# Patient Record
Sex: FEMALE | Race: WHITE | NOT HISPANIC OR LATINO | URBAN - METROPOLITAN AREA
[De-identification: names, ages, dates, MRNs, and addresses within clinical notes are randomized per-mention and may not be internally consistent; named-entity substitution may affect disease eponyms.]

---

## 2018-11-25 ENCOUNTER — HOSPITAL ENCOUNTER (OUTPATIENT)
Dept: MEDSURG UNIT | Facility: HOSPITAL | Age: 73
End: 2018-11-27
Attending: INTERNAL MEDICINE | Admitting: INTERNAL MEDICINE

## 2018-11-25 LAB
ABS NEUT (OLG): 6.7 X10(3)/MCL (ref 1.5–6.9)
ALBUMIN SERPL-MCNC: 3.5 GM/DL (ref 3.4–5)
ALBUMIN/GLOB SERPL: 0.9 RATIO
ALP SERPL-CCNC: 85 UNIT/L (ref 30–113)
ALT SERPL-CCNC: 18 UNIT/L (ref 10–45)
APPEARANCE, UA: CLEAR
APTT PPP: 24 SECOND(S) (ref 25–35)
AST SERPL-CCNC: 15 UNIT/L (ref 15–37)
BACTERIA SPEC CULT: ABNORMAL /HPF
BASOPHILS # BLD AUTO: 0 X10(3)/MCL (ref 0–0.1)
BASOPHILS NFR BLD AUTO: 1 % (ref 0–1)
BILIRUB SERPL-MCNC: 0.5 MG/DL (ref 0.1–0.9)
BILIRUB UR QL STRIP: NEGATIVE
BILIRUBIN DIRECT+TOT PNL SERPL-MCNC: 0.1 MG/DL (ref 0–0.3)
BILIRUBIN DIRECT+TOT PNL SERPL-MCNC: 0.4 MG/DL
BUN SERPL-MCNC: 6 MG/DL (ref 10–20)
CALCIUM SERPL-MCNC: 9.1 MG/DL (ref 8–10.5)
CHLORIDE SERPL-SCNC: 108 MMOL/L (ref 100–108)
CK MB SERPL-MCNC: 1.2 NG/ML (ref 0–3.6)
CK SERPL-CCNC: 73 UNIT/L (ref 39–225)
CO2 SERPL-SCNC: 26 MMOL/L (ref 21–35)
COLOR UR: ABNORMAL
CREAT SERPL-MCNC: 1.13 MG/DL (ref 0.7–1.3)
EOSINOPHIL # BLD AUTO: 0.1 X10(3)/MCL (ref 0–0.6)
EOSINOPHIL NFR BLD AUTO: 1 % (ref 0–5)
ERYTHROCYTE [DISTWIDTH] IN BLOOD BY AUTOMATED COUNT: 13.5 % (ref 11.5–17)
GLOBULIN SER-MCNC: 3.7 GM/DL
GLUCOSE (UA): NEGATIVE
GLUCOSE SERPL-MCNC: 117 MG/DL (ref 75–116)
HCO3 UR-SCNC: 23.7 MMOL/L (ref 22–26)
HCO3 UR-SCNC: 23.7 MMOL/L (ref 22–26)
HCT VFR BLD AUTO: 36.9 % (ref 36–48)
HGB BLD-MCNC: 11.6 GM/DL (ref 12–16)
HGB UR QL STRIP: ABNORMAL
IMM GRANULOCYTES # BLD AUTO: 0.04 10*3/UL (ref 0–0.02)
IMM GRANULOCYTES NFR BLD AUTO: 0.5 % (ref 0–0.43)
INR PPP: 1 (ref 0–1.2)
KETONES UR QL STRIP: NEGATIVE
LACTATE SERPL-SCNC: 1.6 MMOL/L (ref 0.4–2)
LACTATE SERPL-SCNC: 2.3 MMOL/L (ref 0.4–2)
LACTATE SERPL-SCNC: 2.3 MMOL/L (ref 0.4–2)
LACTATE SERPL-SCNC: 2.6 MMOL/L (ref 0.4–2)
LACTATE SERPL-SCNC: 2.7 MMOL/L (ref 0.4–2)
LACTATE SERPL-SCNC: 3.1 MMOL/L (ref 0.4–2)
LACTATE SERPL-SCNC: 3.4 MMOL/L (ref 0.4–2)
LEUKOCYTE ESTERASE UR QL STRIP: ABNORMAL
LYMPHOCYTES # BLD AUTO: 0.9 X10(3)/MCL (ref 0.5–4.1)
LYMPHOCYTES NFR BLD AUTO: 10 % (ref 15–40)
MCH RBC QN AUTO: 29 PG (ref 27–34)
MCHC RBC AUTO-ENTMCNC: 31 GM/DL (ref 31–36)
MCV RBC AUTO: 92 FL (ref 80–99)
MONOCYTES # BLD AUTO: 0.9 X10(3)/MCL (ref 0–1.1)
MONOCYTES NFR BLD AUTO: 10 % (ref 4–12)
MUCOUS THREADS URNS QL MICRO: ABNORMAL
NEUTROPHILS # BLD AUTO: 6.7 X10(3)/MCL (ref 1.5–6.9)
NEUTROPHILS NFR BLD AUTO: 78 % (ref 43–75)
NITRITE UR QL STRIP: NEGATIVE
PCO2 BLDA: 35.1 MMHG (ref 35–45)
PCO2 BLDA: 35.1 MMHG (ref 35–45)
PH SMN: 7.44 [PH] (ref 7.35–7.45)
PH SMN: 7.44 [PH] (ref 7.35–7.45)
PH UR STRIP: 6 [PH]
PLATELET # BLD AUTO: 349 X10(3)/MCL (ref 140–400)
PMV BLD AUTO: 8.8 FL (ref 6.8–10)
PO2 BLDA: 65 MMHG (ref 80–100)
PO2 BLDA: 65 MMHG (ref 80–100)
POC ALLENS TEST: POSITIVE
POC BE: 0 (ref -2–3)
POC BE: 0 MMOL/L (ref -2–3)
POC CO2: 25 MMOL/L (ref 22–27)
POC SATURATED O2: 93 % (ref 96–97)
POC SATURATED O2: 93 MMHG (ref 96–97)
POC SITE: ABNORMAL
POC TCO2: 25 MMOL/L (ref 24–29)
POC TREATMENT: ABNORMAL
POTASSIUM SERPL-SCNC: 3.5 MMOL/L (ref 3.6–5.2)
PROT SERPL-MCNC: 7.2 GM/DL (ref 6.4–8.2)
PROT UR QL STRIP: NEGATIVE
PROTHROMBIN TIME: 11 SECOND(S) (ref 9–12)
RBC # BLD AUTO: 4.03 X10(6)/MCL (ref 4.2–5.4)
RBC #/AREA URNS HPF: ABNORMAL /HPF
SODIUM SERPL-SCNC: 145 MMOL/L (ref 135–145)
SP GR UR STRIP: 1.02
SQUAMOUS EPITHELIAL, UA: ABNORMAL /LPF
TROPONIN I SERPL-MCNC: <0.017 NG/ML (ref 0–0.06)
UROBILINOGEN UR STRIP-ACNC: 0.2 EU/DL
WBC # SPEC AUTO: 8.7 X10(3)/MCL (ref 4.5–11.5)
WBC #/AREA URNS HPF: ABNORMAL /HPF

## 2018-11-26 LAB
ABS NEUT (OLG): 8.7 X10(3)/MCL (ref 1.5–6.9)
BASOPHILS # BLD AUTO: 0 X10(3)/MCL (ref 0–0.1)
BASOPHILS NFR BLD AUTO: 0 % (ref 0–1)
BUN SERPL-MCNC: 10 MG/DL (ref 10–20)
CALCIUM SERPL-MCNC: 8.6 MG/DL (ref 8–10.5)
CHLORIDE SERPL-SCNC: 107 MMOL/L (ref 100–108)
CO2 SERPL-SCNC: 19 MMOL/L (ref 21–35)
CREAT SERPL-MCNC: 1.06 MG/DL (ref 0.7–1.3)
CREAT/UREA NIT SERPL: 9
ERYTHROCYTE [DISTWIDTH] IN BLOOD BY AUTOMATED COUNT: 13.7 % (ref 11.5–17)
GLUCOSE SERPL-MCNC: 196 MG/DL (ref 75–116)
HCT VFR BLD AUTO: 34 % (ref 36–48)
HGB BLD-MCNC: 10.8 GM/DL (ref 12–16)
IMM GRANULOCYTES # BLD AUTO: 0.03 10*3/UL (ref 0–0.02)
IMM GRANULOCYTES NFR BLD AUTO: 0.3 % (ref 0–0.43)
LACTATE SERPL-SCNC: 1.3 MMOL/L (ref 0.4–2)
LACTATE SERPL-SCNC: 2.2 MMOL/L (ref 0.4–2)
LACTATE SERPL-SCNC: 2.4 MMOL/L (ref 0.4–2)
LACTATE SERPL-SCNC: 2.7 MMOL/L (ref 0.4–2)
LACTATE SERPL-SCNC: 2.8 MMOL/L (ref 0.4–2)
LACTATE SERPL-SCNC: 3.3 MMOL/L (ref 0.4–2)
LACTATE SERPL-SCNC: 3.4 MMOL/L (ref 0.4–2)
LACTATE SERPL-SCNC: 3.5 MMOL/L (ref 0.4–2)
LACTATE SERPL-SCNC: 4.2 MMOL/L (ref 0.4–2)
LYMPHOCYTES # BLD AUTO: 0.4 X10(3)/MCL (ref 0.5–4.1)
LYMPHOCYTES NFR BLD AUTO: 4 % (ref 15–40)
MAGNESIUM SERPL-MCNC: 2.2 MG/DL (ref 1.8–2.4)
MCH RBC QN AUTO: 29 PG (ref 27–34)
MCHC RBC AUTO-ENTMCNC: 32 GM/DL (ref 31–36)
MCV RBC AUTO: 92 FL (ref 80–99)
MONOCYTES # BLD AUTO: 0.1 X10(3)/MCL (ref 0–1.1)
MONOCYTES NFR BLD AUTO: 1 % (ref 4–12)
NEUTROPHILS # BLD AUTO: 8.7 X10(3)/MCL (ref 1.5–6.9)
NEUTROPHILS NFR BLD AUTO: 94 % (ref 43–75)
PLATELET # BLD AUTO: 299 X10(3)/MCL (ref 140–400)
PMV BLD AUTO: 9.1 FL (ref 6.8–10)
POTASSIUM SERPL-SCNC: 3.5 MMOL/L (ref 3.6–5.2)
RBC # BLD AUTO: 3.69 X10(6)/MCL (ref 4.2–5.4)
SODIUM SERPL-SCNC: 139 MMOL/L (ref 135–145)
WBC # SPEC AUTO: 9.3 X10(3)/MCL (ref 4.5–11.5)

## 2018-11-27 LAB
ABS NEUT (OLG): 16.6 X10(3)/MCL (ref 1.5–6.9)
BASOPHILS # BLD AUTO: 0 X10(3)/MCL (ref 0–0.1)
BASOPHILS NFR BLD AUTO: 0 % (ref 0–1)
BUN SERPL-MCNC: 15 MG/DL (ref 10–20)
CALCIUM SERPL-MCNC: 9.1 MG/DL (ref 8–10.5)
CHLORIDE SERPL-SCNC: 109 MMOL/L (ref 100–108)
CO2 SERPL-SCNC: 22 MMOL/L (ref 21–35)
CREAT SERPL-MCNC: 1.17 MG/DL (ref 0.7–1.3)
CREAT/UREA NIT SERPL: 13
ERYTHROCYTE [DISTWIDTH] IN BLOOD BY AUTOMATED COUNT: 13.5 % (ref 11.5–17)
FINAL CULTURE: NORMAL
FINAL CULTURE: NORMAL
GLUCOSE SERPL-MCNC: 164 MG/DL (ref 75–116)
GRAM STN SPEC: NORMAL
HCT VFR BLD AUTO: 34.3 % (ref 36–48)
HGB BLD-MCNC: 10.6 GM/DL (ref 12–16)
IMM GRANULOCYTES # BLD AUTO: 0.12 10*3/UL (ref 0–0.02)
IMM GRANULOCYTES NFR BLD AUTO: 0.7 % (ref 0–0.43)
LACTATE SERPL-SCNC: 1.8 MMOL/L (ref 0.4–2)
LACTATE SERPL-SCNC: 2.1 MMOL/L (ref 0.4–2)
LACTATE SERPL-SCNC: 2.2 MMOL/L (ref 0.4–2)
LYMPHOCYTES # BLD AUTO: 0.8 X10(3)/MCL (ref 0.5–4.1)
LYMPHOCYTES NFR BLD AUTO: 5 % (ref 15–40)
MAGNESIUM SERPL-MCNC: 2 MG/DL (ref 1.8–2.4)
MCH RBC QN AUTO: 29 PG (ref 27–34)
MCHC RBC AUTO-ENTMCNC: 31 GM/DL (ref 31–36)
MCV RBC AUTO: 94 FL (ref 80–99)
MONOCYTES # BLD AUTO: 0.4 X10(3)/MCL (ref 0–1.1)
MONOCYTES NFR BLD AUTO: 2 % (ref 4–12)
NEUTROPHILS # BLD AUTO: 16.6 X10(3)/MCL (ref 1.5–6.9)
NEUTROPHILS NFR BLD AUTO: 92 % (ref 43–75)
PLATELET # BLD AUTO: 349 X10(3)/MCL (ref 140–400)
PMV BLD AUTO: 9.2 FL (ref 6.8–10)
POTASSIUM SERPL-SCNC: 4.1 MMOL/L (ref 3.6–5.2)
RBC # BLD AUTO: 3.65 X10(6)/MCL (ref 4.2–5.4)
SODIUM SERPL-SCNC: 142 MMOL/L (ref 135–145)
WBC # SPEC AUTO: 17.9 X10(3)/MCL (ref 4.5–11.5)

## 2018-12-01 LAB
FINAL CULTURE: NORMAL
FINAL CULTURE: NORMAL

## 2019-07-12 ENCOUNTER — HISTORICAL (OUTPATIENT)
Dept: RADIOLOGY | Facility: HOSPITAL | Age: 74
End: 2019-07-12

## 2019-09-19 LAB
ABS NEUT (OLG): 3.9 X10(3)/MCL (ref 1.5–6.9)
ALBUMIN SERPL-MCNC: 3.4 GM/DL (ref 3.4–5)
ALBUMIN/GLOB SERPL: 0.8 RATIO
ALP SERPL-CCNC: 95 UNIT/L (ref 30–113)
ALT SERPL-CCNC: 12 UNIT/L (ref 10–45)
APTT PPP: 23.9 SECOND(S) (ref 25–35)
AST SERPL-CCNC: 10 UNIT/L (ref 15–37)
BASOPHILS # BLD AUTO: 0.1 X10(3)/MCL (ref 0–0.1)
BASOPHILS NFR BLD AUTO: 1 % (ref 0–1)
BILIRUB SERPL-MCNC: 0.3 MG/DL (ref 0.1–0.9)
BILIRUBIN DIRECT+TOT PNL SERPL-MCNC: 0.1 MG/DL (ref 0–0.3)
BILIRUBIN DIRECT+TOT PNL SERPL-MCNC: 0.2 MG/DL
BUN SERPL-MCNC: 11 MG/DL (ref 10–20)
CALCIUM SERPL-MCNC: 8.4 MG/DL (ref 8–10.5)
CHLORIDE SERPL-SCNC: 106 MMOL/L (ref 100–108)
CO2 SERPL-SCNC: 27 MMOL/L (ref 21–35)
CREAT SERPL-MCNC: 1.23 MG/DL (ref 0.7–1.3)
EOSINOPHIL # BLD AUTO: 0.4 X10(3)/MCL (ref 0–0.6)
EOSINOPHIL NFR BLD AUTO: 5 % (ref 0–5)
ERYTHROCYTE [DISTWIDTH] IN BLOOD BY AUTOMATED COUNT: 13.4 % (ref 11.5–17)
GLOBULIN SER-MCNC: 4.1 GM/DL
GLUCOSE SERPL-MCNC: 124 MG/DL (ref 75–116)
HCT VFR BLD AUTO: 36.4 % (ref 36–48)
HGB BLD-MCNC: 11.3 GM/DL (ref 12–16)
IMM GRANULOCYTES # BLD AUTO: 0.02 10*3/UL (ref 0–0.02)
IMM GRANULOCYTES NFR BLD AUTO: 0.3 % (ref 0–0.43)
INR PPP: 1 (ref 0–1.2)
LYMPHOCYTES # BLD AUTO: 1.7 X10(3)/MCL (ref 0.5–4.1)
LYMPHOCYTES NFR BLD AUTO: 26 % (ref 15–40)
MCH RBC QN AUTO: 27 PG (ref 27–34)
MCHC RBC AUTO-ENTMCNC: 31 GM/DL (ref 31–36)
MCV RBC AUTO: 88 FL (ref 80–99)
MONOCYTES # BLD AUTO: 0.6 X10(3)/MCL (ref 0–1.1)
MONOCYTES NFR BLD AUTO: 10 % (ref 4–12)
NEUTROPHILS # BLD AUTO: 3.9 X10(3)/MCL (ref 1.5–6.9)
NEUTROPHILS NFR BLD AUTO: 58 % (ref 43–75)
PLATELET # BLD AUTO: 380 X10(3)/MCL (ref 140–400)
PMV BLD AUTO: 8.8 FL (ref 6.8–10)
POTASSIUM SERPL-SCNC: 4.2 MMOL/L (ref 3.6–5.2)
PROT SERPL-MCNC: 7.5 GM/DL (ref 6.4–8.2)
PROTHROMBIN TIME: 10.3 SECOND(S) (ref 9–12)
RBC # BLD AUTO: 4.14 X10(6)/MCL (ref 4.2–5.4)
SODIUM SERPL-SCNC: 141 MMOL/L (ref 135–145)
WBC # SPEC AUTO: 6.8 X10(3)/MCL (ref 4.5–11.5)

## 2019-09-23 ENCOUNTER — HISTORICAL (OUTPATIENT)
Dept: ANESTHESIOLOGY | Facility: HOSPITAL | Age: 74
End: 2019-09-23

## 2022-03-26 ENCOUNTER — HISTORICAL (OUTPATIENT)
Dept: ADMINISTRATIVE | Facility: HOSPITAL | Age: 77
End: 2022-03-26

## 2022-03-31 ENCOUNTER — HISTORICAL (OUTPATIENT)
Dept: SURGERY | Facility: HOSPITAL | Age: 77
End: 2022-03-31

## 2022-04-05 ENCOUNTER — HISTORICAL (OUTPATIENT)
Dept: ANESTHESIOLOGY | Facility: HOSPITAL | Age: 77
End: 2022-04-05

## 2022-04-29 NOTE — OP NOTE
ADMITTING DIAGNOSIS:  Need for age-appropriate screening colonoscopy.    PROCEDURE:  Colonoscopy to the cecum with intubation of ileocecal valve.    POSTOPERATIVE DIAGNOSIS:    1. Normal terminal ileum up to 10 cm.   2. Normal colonoscopy with food in the cecum making the cecal area a little more difficult to visualize.   3. Appropriate retroflexion of the scope in the ascending colon and no abnormalities found.   4. Normal ascending, transverse, and descending colon.  5. Mild diverticulosis of the sigmoid colon.    6. Good tone, no masses on rectal exam.    INDICATIONS:  The patient is a 73-year-old  female, morbidly obese at 5 feet, 209 pounds.  Patient had hypertension, osteoarthritis, COPD.  She is anemic and has midepigastric abdominal pain.  She was referred by Mr. Cruzito Luna for colon screening and workup of her midepigastric pain.  Ultrasound of the abdomen was obtained.  The patient was scheduled for a colonoscopy with a flexible Olympus scope under sedation.     The cecum, ascending colon, transverse colon, descending colon were examined with a flexible scope, and I intubated the ileocecal valve and examined the terminal ileum.  The terminal ileum up to 10 cm was normal.  The patient had normal cecum, ascending colon, transverse colon, descending colon.  I was able to retroflex the scope in the cecum and found no abnormalities.  There was some food, mostly beans and greens, within the cecum that was washed.  I could not suck it all out, but I was able to wash out enough of it and move it around such that I could see underneath that was normal.  The patient had diverticulosis of the sigmoid colon that was a little bit difficult to navigate initially, but resolved with passage of the scope.  The patient had grade 2 good tone, no masses.  No other abnormalities were seen.  Overall, the patient did very well, had no problems or difficulties, was awakened and sent to recovery in good  condition.  I appreciate the referral from her nurse practitioner, Krysta CalvertCruzito Jeremy.    PLAN:    1. Check ultrasound of the abdomen.   2. Follow up at the office to discuss colonoscopy screening results.   3. To follow up in 2 years, recheck stools for blood.   4. Follow up in 10 years for repeat screening colonoscopy if necessary.        GISELL/MIQUEL   DD: 09/23/2019 1026   DT: 09/23/2019 1058  Job # [id number]    cc: Cruzito Luna NP

## 2022-04-29 NOTE — ED PROVIDER NOTES
"   Patient:   Jose J Butcher             MRN: [id number]            FIN: [id number]               Age:   73 years     Sex:  Female     :  1945   Associated Diagnoses:   Sepsis; Acute exacerbation of chronic obstructive pulmonary disease (COPD)   Author:   Marina Patel MD      Basic Information   Time seen: Date & time 2018 10:10:00.   History source: Patient.   Arrival mode: Private vehicle.   History limitation: None.   Additional information: Chief Complaint from Nursing Triage Note : Chief Complaint   2018 10:08 CST     Chief Complaint           sob, productive cough, BL rib pain when coughing,  pt states " i feel like i have pneumonia again "  .      History of Present Illness   The patient presents with cough and , Shortness of breath, bilateral rib pains.  The onset was 2  days ago.  The course/duration of symptoms is constant.  Character dry.  The degree at onset was moderate.  The degree at present is moderate.  The exacerbating factor is none.  The relieving factor is beta agonist.  Risk factors consist of hypertension and chronic obstructive pulmonary disease.  Prior episodes: occasional.  Therapy today: none.  Associated symptoms: shortness of breath and chest pain.        Review of Systems   Constitutional symptoms:  No fever, no chills.    ENMT symptoms:  Nasal congestion.   Respiratory symptoms:  Shortness of breath, cough.    Cardiovascular symptoms:  Chest pain, bilateral chest, lateral, moderate pain, achy.    Gastrointestinal symptoms:  No abdominal pain,    Musculoskeletal symptoms:  No back pain,    Neurologic symptoms:  No headache,              Additional review of systems information: All other systems reviewed and otherwise negative.      Health Status   Allergies:    Allergic Reactions (Selected)  No Known Medication Allergies.   Medications:  (Selected)   Inpatient Medications  Ordered  [id number] 1,000 mL: 1,000 mL, 1,000 mL, IV, 150 mL/hr, start date 18 " 10:23:00 CST  Documented Medications  Documented  cloniDINE 0.2 mg oral tablet: 0.2 mg = 1 tab(s), Oral, BID, # 60 tab(s), 0 Refill(s), per nurse's notes.      Past Medical/ Family/ Social History   Medical history:    Active  HTN (hypertension) (SNOMED CT [id number])  Obesity (SNOMED CT 3882119104)  COPD (chronic obstructive pulmonary disease) (SNOMED CT [id number]), Reviewed as documented in chart.   Surgical history:    Cholecystectomy ([id number]).  Hysterectomy (216737568)., Reviewed as documented in chart.   Family history:    Cancer - unknown origin  Father ()  Coronary artery disease  Mother ()  , Reviewed as documented in chart.   Social history: Alcohol use: Denies, Tobacco use: Denies, Drug use: Denies.   Problem list:    Active Problems (3)  COPD (chronic obstructive pulmonary disease)   HTN (hypertension)   Obesity , per nurse's notes.      Physical Examination               Vital Signs   Vital Signs   2018 10:08 CST     Temperature Oral          36.2 DegC                             Temperature Oral (calculated)             97.16 DegF                             Peripheral Pulse Rate     87 bpm                             Respiratory Rate          20 br/min                             SpO2                      98 %                             Oxygen Therapy            Room air                             Systolic Blood Pressure   209 mmHg  HI                             Diastolic Blood Pressure  85 mmHg  .   Measurements   2018 10:08 CST     Weight Dosing             100 kg                             Weight Measured and Calculated in Lbs     220.46 lb                             Weight Estimated          100 kg                             Height/Length Dosing      168 cm                             Height/Length Estimated   168 cm                             Body Mass Index Estimated 35.43 kg/m2  .   Oxygen saturation.    General:  Alert, mild distress.    Skin:  Normal for ethnicity.   Head:  Normocephalic.   Neck:  Supple.   Eye:  Extraocular movements are intact, No icterus.    Ears, nose, mouth and throat:  Oral mucosa moist, no pharyngeal erythema or exudate.    Cardiovascular:  Regular rate and rhythm, No murmur, Normal peripheral perfusion, No edema.    Respiratory:  Breath sounds are equal, Respirations: Tachypneic, Breath sounds: Bilateral, diminished, rhonchi present, wheezes present moderate.    Back:  Nontender, Normal range of motion.    Musculoskeletal:  Normal ROM.   Chest wall   Gastrointestinal:  Soft, Nontender, Non distended, Normal bowel sounds.    Neurological:  Alert and oriented to person, place, time, and situation, normal motor observed, normal speech observed.    Lymphatics   Psychiatric:  Cooperative, appropriate mood & affect.       Medical Decision Making   Documents reviewed:  Emergency department nurses' notes.   Orders  Launch Order Profile (Selected)   Inpatient Orders  Ordered  Cardiac Monitorin18 10:23:00 CST, Constant Order, Place on telemetry.  Contact MD for order for Aspirin and NTG.: 18 10:23:00 CST, Contact MD for order for Aspirin and NTG.  Fall Risk Protocol: 18 10:43:47 CST, Constant Order  [id number] 1,000 mL: 1,000 mL, 1,000 mL, IV, 150 mL/hr, start date 18 10:23:00 CST  Ordered (Collected)  Urine Culture [id number]: Stat collect, 18 11:20:00 CST, Urine, Collected, Nurse collect, [id number], Stop date 18 11:20:00 CST, Print Label By Order Location  Ordered (In Transit)  Blood Culture: 18 10:23:00 CST, Stat collect, Blood, Stop date 18 10:23:00 CST, Print Label By Order Location  Blood Culture: 18 10:23:00 CST, Stat collect, Blood, Stop date 18 10:23:00 CST, Print Label By Order Location  Ordered (In-Lab)  BNP-Pro: Stat collect, 18 10:23:00 CST, Blood, Once, Stop date 18 10:23:00 CST, Lab Collect, 18 10:23:00  CST  Lactic Acid: Stat collect, 11/25/18 10:23:00 CST, Blood, Stop date 11/25/18 10:23:00 CST, Lab Collect, Print Label By Order Location, 11/25/18 10:23:00 CST  Completed  Aerosol Treatment: 11/25/18 10:21:00 CST, 11/25/18 10:21:00 CST  Aerosol Treatment: 11/25/18 10:22:00 CST, 11/25/18 10:22:00 CST  Automated Diff: Stat collect, 11/25/18 10:37:00 CST, Blood, Collected, Stop date 11/25/18 10:37:00 CST, Lab Collect, 11/25/18 10:23:00 CST  Blood Gas Arterial Request POC: Stat collect, Arterial Blood, 11/25/18 10:23:00 CST, Once, Stop date 11/25/18 10:23:00 CST  Blood Pressure: 11/25/18 10:23:00 CST, Stop date 11/25/18 10:23:00 CST, Monitor blood pressure with Mean Arterial Pressure.  CBC w/ Auto Diff: Stat collect, 11/25/18 10:23:00 CST, Blood, Stop date 11/25/18 10:23:00 CST, Lab Collect, 11/25/18 10:23:00 CST  CK MB: Stat collect, 11/25/18 10:23:00 CST, Blood, Stop date 11/25/18 10:23:00 CST, Lab Collect, Print Label By Order Location, 11/25/18 10:23:00 CST  CMP: Stat collect, 11/25/18 10:23:00 CST, Blood, Stop date 11/25/18 10:23:00 CST, Lab Collect, 11/25/18 10:23:00 CST  CPK: Stat collect, 11/25/18 10:23:00 CST, Blood, Stop date 11/25/18 10:23:00 CST, Lab Collect, Print Label By Order Location, 11/25/18 10:23:00 CST  DuoNeb: 3 mL, form: Soln, NEB, Once, first dose 11/25/18 10:21:00 CST, stop date 11/25/18 10:21:00 CST, STAT  EKG Adult 12 Lead: 11/25/18 10:23:00 CST, Stat, Chest Pain, 11/25/18 10:23:00 CST, Show to provider upon completion., -1, -1, 11/25/18 10:23:00 CST  Estimated Glomerular Filtration Rate: Stat collect, 11/25/18 10:37:00 CST, Blood, Collected, Stop date 11/25/18 10:37:00 CST, Lab Collect, 11/25/18 10:23:00 CST  POC IStat CG3: Blood, Routine collect, Collected, 11/25/18 10:55:07 CST  PT (Protime): Stat collect, 11/25/18 10:23:00 CST, Blood, Stop date 11/25/18 10:23:00 CST, Lab Collect, 11/25/18 10:23:00 CST  PTT: Stat collect, 11/25/18 10:23:00 CST, Blood, Stop date 11/25/18 10:23:00 CST, Lab  Collect, Print Label By Order Location, 11/25/18 10:23:00 CST  Pulmicort Respules 0.5 mg/2 mL inhalation suspension: 0.5 mg, form: Soln-Inh, NEB, Once-NOW, first dose 11/25/18 10:22:00 CST, stop date 11/25/18 10:22:00 CST, STAT  Pulse Oximetry: 11/25/18 10:23:00 CST, Stop date 11/25/18 10:23:00 CST, Place on pulse oximetry.  Monitor oxygen saturation.  Saline Lock Insert: 11/25/18 10:23:00 CST, Stop date 11/25/18 10:23:00 CST  Troponin-I: Stat collect, 11/25/18 10:23:00 CST, Blood, Stop date 11/25/18 10:23:00 CST, Lab Collect, 11/25/18 10:23:00 CST  UA Only Microscopic: Stat collect, Urine, Collected, 11/25/18 11:20:00 CST, Stop date 11/25/18 11:20:00 CST, Nurse collect, Print Label By Order Location  Urinalysis with Microscopic if Indicated: Stat collect, Urine, 11/25/18 10:23:00 CST, Stop date 11/25/18 10:23:00 CST, Nurse collect, Print Label By Order Location  XR Chest 1 View: Stat, 11/25/18 10:23:00 CST, Chest Pain, None, Stretcher, Rad Type, Not Scheduled, 11/25/18 10:23:00 CST  Discontinued  Blood Gas Arterial Request POC: Stat collect, Arterial Blood, 11/25/18 10:25:00 CST, Once, Stop date 11/25/18 10:25:00 CST.   Results review:  Lab results : Lab View   11/25/2018 11:20 CST     UA Appear                 CLEAR                             UA Color                  STRAW                             UA Spec Grav              1.025  NA                             UA Bili                   Negative                             UA pH                     6.0  NA                             UA Urobilinogen           0.2 EU/dL                             UA Blood                  SMALL                             UA Glucose                Negative                             UA Ketones                Negative                             UA Protein                Negative                             UA Nitrite                Negative                             UA Leuk Est               Trace                              UA WBC                    0-2 /HPF                             UA RBC                    2-5 /HPF                             UA Mucous                 2+                             UA Bacteria               Few /HPF                             UA Squam Epithelial       Moderate /LPF    11/25/2018 10:55 CST     Treatment                 ra                             Site                      Radial Lt                             pH Art                    7.438                             POC pH                    7.438                             pCO2 Art                  35.1 mmHg                             POC pCO2                  35.1 mmHg                             pO2 Art                   65.0 mmHg  LOW                             POC pO2                   65.0 mmHg  LOW                             HCO3 Art                  23.7 mmol/L                             POC HCO3                  23.7 mmol/L                             CO2 Totl Art              25.0 mmol/L                             POC TCO2                  25.0 mmol/L                             O2 Sat Art                93.0 mmHg  LOW                             POC sO2                   93.0 %  LOW                             D base                    0.0                             POC BE                    0 mmol/L                             Allens                    Positive    11/25/2018 10:37 CST     Sodium Lvl                145 mmol/L                             Potassium Lvl             3.5 mmol/L  LOW                             Chloride                  108 mmol/L                             CO2                       26 mmol/L                             Calcium Lvl               9.1 mg/dL                             Glucose Lvl               117 mg/dL  HI                             BUN                       6 mg/dL  LOW                             Creatinine                1.13 mg/dL                             eGFR-AA                    >60 mL/min/1.73 m2  NA                             eGFR-NIRMAL                  50 mL/min/1.73 m2  NA                             Bili Total                0.5 mg/dL                             Bili Direct               0.10 mg/dL                             Bili Indirect             0.40 mg/dL  NA                             AST                       15 unit/L                             ALT                       18 unit/L                             Alk Phos                  85 unit/L                             Total Protein             7.2 gm/dL                             Albumin Lvl               3.5 gm/dL                             Globulin                  3.70 gm/dL  NA                             A/G Ratio                 0.9 ratio  NA                             Total CK                  73 unit/L                             CK MB                     1.2 ng/mL                             Troponin-I                <0.017 ng/mL                             PT                        11.0 second(s)                             INR                       1.0                             PTT                       24.0 second(s)  LOW                             WBC                       8.7 x10(3)/mcL                             RBC                       4.03 x10(6)/mcL  LOW                             Hgb                       11.6 gm/dL  LOW                             Hct                       36.9 %                             Platelet                  349 x10(3)/mcL                             MCV                       92 fL                             MCH                       29 pg                             MCHC                      31 gm/dL                             RDW                       13.5 %                             MPV                       8.8 fL                             Abs Neut                  6.7 x10(3)/mcL                             Neutro Auto               78 %  HI                              Lymph Auto                10 %  LOW                             Mono Auto                 10 %                             Eos Auto                  1 %                             Abs Eos                   0.1 x10(3)/mcL                             Basophil Auto             1 %                             Abs Neutro                6.7 x10(3)/mcL                             Abs Lymph                 0.9 x10(3)/mcL                             Abs Mono                  0.9 x10(3)/mcL                             Abs Baso                  0.0 x10(3)/mcL                             IG%                       0.500 %  HI                             IG#                       0.0400  HI  , Lab results : Lab View   11/25/2018 10:37 CST     Lactic Acid Lvl           2.3 mmol/L  CRIT  ,    Labs (Last four charted values)  WBC                  8.7 (NOV 25)   Hgb                  L 11.6 (NOV 25)   Hct                  36.9 (NOV 25)   Plt                  349 (NOV 25)   PT                   11.0 (NOV 25)   INR                  1.0 (NOV 25)   PTT                  L 24.0 (NOV 25) .    Radiology results:  Rad Results (ST)  < 12 hrs   Accession: [id number]  Order: XR Chest 1 View  Report Dt/Tm: 11/25/2018 10:52  Report:   HISTORY: Chest pain.     EXAM: XR Chest 1 View.      PRIOR STUDY: Chest radiograph 7/16/2017.     FINDINGS: Radiographic examination of the chest in a single view  demonstrates clear lungs without consolidation or effusion. There is  no pneumothorax. The cardiac silhouette is normal. There is no acute  osseous abnormality.     IMPRESSION: No acute cardiopulmonary abnormality.    .      Reexamination/ Reevaluation   Vital signs   results included from flowsheet : Vital Signs   11/25/2018 11:20 CST     Peripheral Pulse Rate     82 bpm                             Respiratory Rate          20 br/min                             SpO2                      100 %                             Oxygen Therapy             Nasal cannula                             Oxygen Flow Rate          2 L/min                             Systolic Blood Pressure   158 mmHg  HI                             Diastolic Blood Pressure  70 mmHg                             Mean Arterial Pressure, Cuff              99 mmHg     Interventions: PowerOrders   Pharmacy:  DuoNeb 0.5 mg-2.5 mg/3 mL inhalation solution (Order): 3 mL, form: Soln, NEB, Once-NOW, first dose 11/25/2018 11:47 CST, stop date 11/25/2018 11:47 CST  cefTRIAXone 1 g injection (Order): 1 gm, IM, Once-NOW, first dose 11/25/2018 11:47 CST, stop date 11/25/2018 11:47 CST, STAT.   Notes: Patient is feeling better as compared to what she came in with but she continues to have some shortness of breath.      Awake, alert, no distress.  Heart: S1, S2 are audible.  There is no S3 no S4, no murmurs.  Chest is decreased air entry, rhonchi bilaterally   Abdomen is soft, nondistended, nontender.  Bowel sounds are audible.  CNS: No focal deficit  Capillary refill is less than 2 seconds.  Peripheral pulses are palpable bilaterally symmetrical    Patient's chest x-ray is negative, her cardiac workup is negative, white blood cell count is normal, CBC and chemistries essentially normal, but she continues to have wheezing, continues to have decreased air entry, rhonchi bilaterally, I am going to repeat DuoNeb, her lactic acid is elevated, I am going to give her antibiotic and admit her for hospitalist..      Procedure   Critical care note   Total time: 35 minutes spent engaged in work directly related to patient care and/ or available for direct patient care.   Critical condition(s) addressed for impending deterioration include: respiratory.   Associated risk factors: hypoxia.   Management: Interpretation (chest x-ray, blood gases), Case review medical specialist, Alternate history family.   Performed by: self.      Impression and Plan   Diagnosis   Sepsis (EHS60-[place] A41.9)   Acute exacerbation of  chronic obstructive pulmonary disease (COPD) (UZT58-[place] J44.1)      Calls-Consults   -  11/25/2018 11:50:00 , Aundrea OVALLE, Burton PATEL, recommends OK to admit.    Plan   Condition: Improved, Stable.    Counseled: Patient.    Orders: Launch Orders   Admit/Transfer/Discharge:  Admit as Inpatient (Order): 11/25/2018 12:09 CST, Burton Guillaume MD Telemetry with Monitor, No, Acute exacerbation of COPD, sepsis.

## 2022-05-04 NOTE — HISTORICAL [facility] CERNER
This is a historical note converted from Elva. Formatting and pictures may have been removed.  Please reference Elva for original formatting and attached multimedia. Hospital Course  74yo female admitted with acute COPD exacerbation with bronchitis.? Her lactic acid was elevated.? She was placed on IV antibiotics and IV steroids.? Her O2 levels remained stable throughout her hospital stay.? She does have h/o tobacco abuse but quit 40 years ago but she is exposed to secondhand smoke in her house.? Today she is asking to go home and she is stable for discharge.? Will transition her meds to oral and discharge home.  Physical Exam  Vitals & Measurements  T:?36.6? ?C (Oral)? TMIN:?36.6? ?C (Oral)? TMAX:?37.0? ?C (Oral)? HR:?72(Monitored)? RR:?20? BP:?127/71? SpO2:?98%? SpO2:?98%?  General: ?Alert and oriented, No acute distress. ?  ??????? ? Appearance: Well nourished. ?  ??????? ? Behavior: Appropriate. ?  ??????? ? Skin: Normal for ethnicity. ?  ?????Eye: ?Pupils are equal, round and reactive to light, Extraocular movements are intact. ?  ?????HENT: ?Normocephalic, Normal hearing, Oral mucosa is moist, No pharyngeal erythema. ?  ?????Neck: ?Supple, Non-tender, No carotid bruit, No jugular venous distention, No lymphadenopathy, No thyromegaly. ?  ?????Respiratory: ?Lungs are clear to auscultation, Breath sounds are equal, No chest wall tenderness. ?  ?????Cardiovascular: ?Normal rate, Regular rhythm, No murmur, No gallop, Good pulses equal in all extremities, Normal peripheral perfusion, No edema. ?  ?????Gastrointestinal: ?Soft, Non-tender, Non-distended, Normal bowel sounds, No organomegaly. ?obese  ?????Genitourinary: ?No costovertebral angle tenderness, No urethral discharge. ?  ?????Lymphatics: ?No lymphadenopathy neck, axilla, groin. ?  ?????Musculoskeletal: ?Normal range of motion, Normal strength, No tenderness, No swelling, Normal gait. ?  ?????Integumentary: ?Warm, Dry, Pink, Intact, No rash.  ?  ?????Neurologic: ?Alert, Oriented, Normal sensory, Normal motor function, No focal deficits, Cranial Nerves II-XII are grossly intact. ?  ?????Psychiatric: ?Cooperative, Appropriate mood & affect, Normal judgment. ?  Discharge Plan  1.?Acute exacerbation of chronic obstructive pulmonary disease (COPD)?J44.1  2.?HTN (hypertension)?I10  3.?Obesity?E66.9  Cough?[id number]  Sepsis?A41.9  Orders:  azithromycin, 500 mg = 1 tab(s), Oral, Daily, X 10 day(s), # 10 tab(s), 0 Refill(s)  cefdinir, 300 mg = 1 cap(s), Oral, q12hr, X 10 day(s), # 20 cap(s), 0 Refill(s)  cloNIDine, 0.2 mg = 1 tab(s), Oral, BID, 0 Refill(s)  codeine-guaifenesin, 10 mL, Oral, q4hr, PRN PRN as needed for cough, # 60 mL, 0 Refill(s)  guaiFENesin, 1,200 mg = 2 tab(s), Oral, BID, 0 Refill(s)  methylPREDNISolone, 60 mg, form: Injection, IV Push, q12hr, first dose 11/27/18 18:00:00 CST  predniSONE, 20 mg = 1 tab(s), Oral, Daily, 3 tabs daily for 3 days, then 2 tabs daily for 3 days, then 1 tab daily for 3 days, # 18 tab(s), 0 Refill(s)  Discharge, 11/27/18 10:30:00 CST, Home  Discharge Activity, Activity as Tolerated  Discharge Diet, Cardiac  Place in Outpatient Observation, 11/27/18 10:16:00 CST, Medical Unit, No  D/C=35mins  Patient Discharge Condition  stable  Discharge Disposition  home

## 2022-05-04 NOTE — HISTORICAL [facility] CERNER
This is a historical note converted from Elva. Formatting and pictures may have been removed.  Please reference Elva for original formatting and attached multimedia. Chief Complaint  sob, productive cough, BL rib pain when coughing, pt states  i feel like i have pneumonia again   History of Present Illness  72yo female presents to the ED today c/ a 2-3 day h/o SO with a productive cough.? The sputum is clear to white in color.? She denies any fever/chills.? She does state some chest discomfort while coughing.? She denies any sick contacts or recent travels.? She states that she stopped smoking 40 years ago but there are members of her family that continue to smoke inside.? CXR report does not show any infiltrates although there is some mild haziness at the bases which could be just from her body habitus.? WBC was normal and she was not hypoxic.? She was apparently wheezing a lot in the ED requiring some nebs.? Of note her lactic acid was elevated.? Cultures have been obtained and she will be admitted for further treatment.  Review of Systems  ?  ?????Constitutional: ?No fever, No chills, No sweats, No weakness, No fatigue. ?  ?????Eye: ?No double vision, No dry eyes, No photophobia. ?  ?????Ear/Nose/Mouth/Throat: ?No ear pain, No nasal congestion, No sore throat. ?  ?????Respiratory:??+ shortness of breath,?+ cough,?+ sputum production, No hemoptysis, No wheezing,?+ pleuritic pain. ?  ?????Cardiovascular: ?No chest pain, No palpitations, No peripheral edema, No syncope. ?  ?????Gastrointestinal: ?No nausea, No vomiting, No diarrhea, No constipation, No heartburn, No abdominal pain. ?  ?????Genitourinary: ?No dysuria, No hematuria, No change in urine stream. ?  ?????Hematology/Lymphatics: ?No anemia, No bruising tendency, No bruise, No hemorrhage, No petechiae. ?  ?????Endocrine: ?No excessive thirst, No polyuria, No cold intolerance. ?  ?????Immunologic: ?No recurrent fevers, No recurrent infections.  ?  ?????Musculoskeletal: ?Joint pain, No back pain, No muscle pain, No decreased range of motion. ?  ?????Integumentary: ?No rash, No pruritus, No petechiae, No skin lesion. ?  ?????Neurologic: ?Alert and oriented X4, No abnormal balance, No confusion, No tingling. ?  ?????Psychiatric: ?No anxiety, No depression. ?  Physical Exam  Vitals & Measurements  T:?36.9? ?C (Oral)? TMIN:?36.2? ?C (Oral)? TMAX:?36.9? ?C (Oral)? HR:?80(Monitored)? RR:?20? BP:?147/82? SpO2:?98%? WT:?100?kg?  General: ?Alert and oriented, No acute distress. ?  ??????? ? Appearance: Well nourished. ?  ??????? ? Behavior: Appropriate. ?  ??????? ? Skin: Normal for ethnicity. ?  ?????Eye: ?Pupils are equal, round and reactive to light, Extraocular movements are intact. ?  ?????HENT: ?Normocephalic, Normal hearing, Oral mucosa is moist, No pharyngeal erythema. ?  ?????Neck: ?Supple, Non-tender, No carotid bruit, No jugular venous distention, No lymphadenopathy, No thyromegaly. ?  ?????Respiratory:? few wheezes in lower lung fields, Breath sounds are equal,?mild chest wall tenderness. ?  ?????Cardiovascular: ?Normal rate, Regular rhythm, No murmur, No gallop, Good pulses equal in all extremities, Normal peripheral perfusion, No edema. ?  ?????Gastrointestinal: ?Soft, Non-tender, Non-distended, Normal bowel sounds, No organomegaly. ?obese  ?????Genitourinary: ?No costovertebral angle tenderness, No urethral discharge. ?  ?????Lymphatics: ?No lymphadenopathy neck, axilla, groin. ?  ?????Musculoskeletal: ?Normal range of motion, Normal strength, No tenderness, No swelling, Normal gait. ?  ?????Integumentary: ?Warm, Dry, Pink, Intact, No rash. ?  ?????Neurologic: ?Alert, Oriented, Normal sensory, Normal motor function, No focal deficits, Cranial Nerves II-XII are grossly intact. ?  ?????Psychiatric: ?Cooperative, Appropriate mood & affect, Normal judgment. ?  Assessment/Plan  1.?Acute exacerbation of chronic obstructive pulmonary disease  (COPD)?J44.1  2.?HTN (hypertension)?I10  3.?Obesity?E66.9  Cough?[id number]  Sepsis?A41.9  Orders:  guaiFENesin, 1,200 mg, form: Tab-CR, Oral, BID, first dose 11/25/18 21:00:00 CST  Low Sodium Meal Plan, 11/25/18 14:12:00 CST, Start Meal: Now  Magnesium Level, AM Next collect, 11/26/18 5:00:00 CST, Blood, Stop date 11/26/18 5:00:00 CST, Lab Collect, 11/26/18 5:00:00 CST  Pulse Oximetry, 11/25/18 14:06:00 CST, PRN, TO KEEP SATS > 92%.  Up to Chair, 11/25/18 15:38:00 CST, TID  follow labs  DVT prophylaxis  review home meds  IV antibiotics  add mucinex  OOB to chair  Nebs  IV steroids  supplemental O2  possible d/kaelyn 1-2 days  adised to not allow family to smoke inside her house   Problem List/Past Medical History  Ongoing  COPD (chronic obstructive pulmonary disease)  HTN (hypertension)  Obesity  Historical  No qualifying data  Procedure/Surgical History  Cholecystectomy  Hysterectomy   Medications  Inpatient  azithromycin (Zithromax) for IVPB  DuoNeb, 3 mL, NEB, q4hr Resp  IVF Normal Saline NS Infusion 1,000 mL, 1000 mL, IV  Lovenox, 40 mg= 0.4 mL, Subcutaneous, Daily  Mucinex, 1200 mg= 2 tab(s), Oral, BID  [id number] 1,000 mL, 1000 mL, IV  Pulmicort Respules 0.5 mg/2 mL inhalation suspension, 0.5 mg= 2 mL, NEB, BID-Resp  Rocephin (for IVPB)  SOLU-Medrol, 60 mg= 0.96 mL, IV Push, q8hr  Home  cloniDINE 0.2 mg oral tablet, 0.2 mg= 1 tab(s), Oral, BID  diphenhydrAMINE 25 mg oral capsule, 25 mg= 1 cap(s), Oral, TID  Medrol Dosepak 4 mg oral tablet, 1 packet(s), Oral, As Directed  Allergies  No Known Medication Allergies  Social History  Alcohol  Never, 11/25/2018  Home/Environment  Living situation: Home/Independent., 07/05/2016  Substance Abuse  Never, 11/25/2018  Tobacco  Former smoker, N/A, 11/25/2018  Former smoker, 07/05/2016  Family History  Cancer - unknown origin: Father.  Coronary artery disease: Mother.  Lab Results  Test Name Test Result Date/Time Comments   Sodium Lvl 145 mmol/L  11/25/2018 10:37 CST    Potassium Lvl 3.5 mmol/L (Low) 11/25/2018 10:37 CST    Chloride 108 mmol/L 11/25/2018 10:37 CST    CO2 26 mmol/L 11/25/2018 10:37 CST    Glucose Lvl 117 mg/dL (High) 11/25/2018 10:37 CST    BUN 6 mg/dL (Low) 11/25/2018 10:37 CST    Creatinine 1.13 mg/dL 11/25/2018 10:37 CST    Troponin-I <0.017 ng/mL 11/25/2018 10:37 CST 0.5 ng/mL is the accepted discriminant level for mycardial injury rather than a statistical normal limit for the general population.   INR 1.0 11/25/2018 10:37 CST    WBC 8.7 x10(3)/mcL 11/25/2018 10:37 CST    Hgb 11.6 gm/dL (Low) 11/25/2018 10:37 CST    Hct 36.9 % 11/25/2018 10:37 CST    Platelet 349 x10(3)/mcL 11/25/2018 10:37 CST

## 2022-05-14 NOTE — OP NOTE
Patient:   Jose J Zhang             MRN: [id number]            FIN: [id number]               Age:   76 years     Sex:  Female     :  1945   Associated Diagnoses:   Anemia   Author:   Luisa Ayala MD      Operative Note   Operative Information   Date/ Time:  2022 13:53:00.     Procedures Performed: Procedure Code   Colonoscopy, flexible; diagnostic, including collection of specimen(s) by brushing or washing, when performed (separate procedure) (99670)..     Indications: 76-year-old female with a history of anemia in need of diagnostic colonoscopy.     Preoperative Diagnosis: Anemia (FFC84-RD D64.9).     Postoperative Diagnosis: Anemia (HZX27-AZ D64.9).     Surgeon: Luisa Ayala MD.     Anesthesia: Intravenous MAC.     Speciman Removed: None.     Description of Procedure/Findings/    Complications: Patient was brought to the endoscopy suite laid in a left lateral decubitus position right side up.  Intravenous anesthesia was provided.  Digital rectal exam performed exhibiting good anal rectal tone and no masses.  There was palpable stool noted within the rectal vault.  The scope was then advanced through the anus intubating the rectum and with gentle insufflation reaching to the right side of the colon.  Upon advancement of the scope the entirety of the colon was filled with hard thick stool.  We continue to advance until unable to visualize.  It was thoroughly irrigated to the best of my ability for visualization.  The bowel prep was significantly poor.  The regions which were viewed did not appear to have any large masses identified.  No sources of bleeding found.  I would not consider this a very good source of bowel prep at this time.  However the regions which reviewed including the transverse descending and rectosigmoid regions did not appear to have a large mass.  There were however multiple diverticulum within the sigmoid regions.  Pictures were taken the scope was removed in neutral  position the patient was relieved of anesthesia stable condition of transfer postanesthesia care unit..     Esimated blood loss: No blood loss.     Findings: Very poor bowel prep.     Complications: None.

## 2023-12-28 ENCOUNTER — HOSPITAL ENCOUNTER (EMERGENCY)
Facility: HOSPITAL | Age: 78
Discharge: HOME | End: 2023-12-28
Attending: INTERNAL MEDICINE

## 2023-12-28 VITALS
SYSTOLIC BLOOD PRESSURE: 157 MMHG | RESPIRATION RATE: 20 BRPM | BODY MASS INDEX: 40.48 KG/M2 | HEART RATE: 88 BPM | WEIGHT: 220 LBS | HEIGHT: 62 IN | OXYGEN SATURATION: 100 % | TEMPERATURE: 98 F | DIASTOLIC BLOOD PRESSURE: 88 MMHG

## 2023-12-28 DIAGNOSIS — Z20.6 HIV EXPOSURE: ICD-10-CM

## 2023-12-28 DIAGNOSIS — L40.9 PSORIASIS: Primary | ICD-10-CM

## 2023-12-28 LAB
ALBUMIN SERPL-MCNC: 3.8 G/DL (ref 3.4–4.8)
ALBUMIN/GLOB SERPL: 1.1 RATIO (ref 1.1–2)
ALP SERPL-CCNC: 83 UNIT/L (ref 40–150)
ALT SERPL-CCNC: 10 UNIT/L (ref 0–55)
AST SERPL-CCNC: 18 UNIT/L (ref 5–34)
BASOPHILS # BLD AUTO: 0.07 X10(3)/MCL
BASOPHILS NFR BLD AUTO: 0.6 %
BILIRUB SERPL-MCNC: 0.6 MG/DL
BUN SERPL-MCNC: 9.2 MG/DL (ref 9.8–20.1)
CALCIUM SERPL-MCNC: 9.3 MG/DL (ref 8.4–10.2)
CHLORIDE SERPL-SCNC: 106 MMOL/L (ref 98–107)
CO2 SERPL-SCNC: 26 MMOL/L (ref 23–31)
CREAT SERPL-MCNC: 1.05 MG/DL (ref 0.55–1.02)
EOSINOPHIL # BLD AUTO: 0.7 X10(3)/MCL (ref 0–0.9)
EOSINOPHIL NFR BLD AUTO: 6 %
ERYTHROCYTE [DISTWIDTH] IN BLOOD BY AUTOMATED COUNT: 13.2 % (ref 11.5–17)
GFR SERPLBLD CREATININE-BSD FMLA CKD-EPI: 55 MLS/MIN/1.73/M2
GLOBULIN SER-MCNC: 3.5 GM/DL (ref 2.4–3.5)
GLUCOSE SERPL-MCNC: 122 MG/DL (ref 82–115)
HCT VFR BLD AUTO: 40.1 % (ref 37–47)
HGB BLD-MCNC: 13.5 G/DL (ref 12–16)
IMM GRANULOCYTES # BLD AUTO: 0.07 X10(3)/MCL (ref 0–0.04)
IMM GRANULOCYTES NFR BLD AUTO: 0.6 %
LYMPHOCYTES # BLD AUTO: 1.62 X10(3)/MCL (ref 0.6–4.6)
LYMPHOCYTES NFR BLD AUTO: 13.9 %
MCH RBC QN AUTO: 32.3 PG (ref 27–31)
MCHC RBC AUTO-ENTMCNC: 33.7 G/DL (ref 33–36)
MCV RBC AUTO: 95.9 FL (ref 80–94)
MONOCYTES # BLD AUTO: 1.06 X10(3)/MCL (ref 0.1–1.3)
MONOCYTES NFR BLD AUTO: 9.1 %
NEUTROPHILS # BLD AUTO: 8.11 X10(3)/MCL (ref 2.1–9.2)
NEUTROPHILS NFR BLD AUTO: 69.8 %
NRBC BLD AUTO-RTO: 0 %
PLATELET # BLD AUTO: 541 X10(3)/MCL (ref 130–400)
PMV BLD AUTO: 8.6 FL (ref 7.4–10.4)
POTASSIUM SERPL-SCNC: 3.3 MMOL/L (ref 3.5–5.1)
PROT SERPL-MCNC: 7.3 GM/DL (ref 5.8–7.6)
RBC # BLD AUTO: 4.18 X10(6)/MCL (ref 4.2–5.4)
SODIUM SERPL-SCNC: 142 MMOL/L (ref 136–145)
WBC # SPEC AUTO: 11.63 X10(3)/MCL (ref 4.5–11.5)

## 2023-12-28 PROCEDURE — 80053 COMPREHEN METABOLIC PANEL: CPT | Performed by: INTERNAL MEDICINE

## 2023-12-28 PROCEDURE — 85025 COMPLETE CBC W/AUTO DIFF WBC: CPT | Performed by: INTERNAL MEDICINE

## 2023-12-28 PROCEDURE — 99283 EMERGENCY DEPT VISIT LOW MDM: CPT

## 2023-12-28 PROCEDURE — 25000003 PHARM REV CODE 250: Performed by: INTERNAL MEDICINE

## 2023-12-28 PROCEDURE — 87389 HIV-1 AG W/HIV-1&-2 AB AG IA: CPT | Performed by: INTERNAL MEDICINE

## 2023-12-28 RX ORDER — DIPHENHYDRAMINE HCL 50 MG
50 CAPSULE ORAL
Status: COMPLETED | OUTPATIENT
Start: 2023-12-28 | End: 2023-12-28

## 2023-12-28 RX ORDER — TRIAMCINOLONE ACETONIDE 1 MG/G
OINTMENT TOPICAL 3 TIMES DAILY
Qty: 454 G | Refills: 1 | Status: SHIPPED | OUTPATIENT
Start: 2023-12-28

## 2023-12-28 RX ADMIN — DIPHENHYDRAMINE HYDROCHLORIDE 50 MG: 50 CAPSULE ORAL at 08:12

## 2023-12-29 LAB — HIV 1+2 AB+HIV1 P24 AG SERPL QL IA: NONREACTIVE

## 2023-12-29 NOTE — ED PROVIDER NOTES
"     Source of History:  Patient, grandson, no limitations    Chief complaint:  Mass (C/o "bump"back of neck for 4 months-wants to be checked for hiv-)      HPI:  Jose J Zhang is a 78 y.o. female presenting with Mass (C/o "bump"back of neck for 4 months-wants to be checked for hiv-)        Rash: Patient complains of rash involving the  scalp, trunk, torso, limbs . Rash started several months ago. Appearance of rash scaling, salmon-colored/erythematous macules, papules, and plaques. Rash has not changed over time Initial distribution. Associated symptoms:  itching . Patient has not had previous evaluation of rash. Patient has not had previous treatment.  Patient has not had contacts with similar rash. Patient has not identified precipitant. Patient has not had new exposures (soaps, lotions, laundry detergents, foods, medications, plants, insects or animals.) Grandson reports patient's significant other is currently hospitalized with HIV related illness.         Review of Systems   Constitutional symptoms:  Negative except as documented in HPI.   Skin symptoms:  Negative except as documented in HPI.   HEENT symptoms:  Negative except as documented in HPI.   Respiratory symptoms:  Negative except as documented in HPI.   Cardiovascular symptoms:  Negative except as documented in HPI.   Gastrointestinal symptoms:  Negative except as documented in HPI.    Genitourinary symptoms:  Negative except as documented in HPI.   Musculoskeletal symptoms:  Negative except as documented in HPI.   Neurologic symptoms:  Negative except as documented in HPI.   Psychiatric symptoms:  Negative except as documented in HPI.   Allergy/immunologic symptoms:  Negative except as documented in HPI.             Additional review of systems information: All other systems reviewed and otherwise negative.      Review of patient's allergies indicates:  No Known Allergies    PMH:  As per HPI and below:    Past Medical History:   Diagnosis Date    " "Asthma     COPD (chronic obstructive pulmonary disease)     Hypertension         History reviewed. No pertinent family history.    No past surgical history on file.         There is no problem list on file for this patient.       Physical Exam:    BP (!) 157/88 (BP Location: Right arm, Patient Position: Sitting)   Pulse 88   Temp 97.6 °F (36.4 °C) (Oral)   Resp 20   Ht 5' 2" (1.575 m)   Wt 99.8 kg (220 lb)   SpO2 100%   Breastfeeding No   BMI 40.24 kg/m²     Nursing note and vital signs reviewed.    General:  Alert, no acute distress.   Skin: Normal for Ethnic Origin, No cyanosis, diffuse psoriasis  HEENT: Normocephalic and atraumatic, Vision unchanged, Pupils symmetric, No icterus , Nasal mucosa is pink and moist  Cardiovascular:  Regular rate and rhythm, No edema  Chest Wall: No deformity, equal chest rise  Respiratory:  Lungs are clear to auscultation, respirations are non-labored.    Musculoskeletal:  No deformity, Normal perfusion to all extremities  Gastrointestinal:  Soft, Non distended  Neurological:  Alert and oriented, normal motor observed, normal speech observed.    Psychiatric:  Cooperative, appropriate mood & affect.        Labs that have been ordered have been independently reviewed and interpreted by myself.     Old Chart Reviewed.      Initial Impression/ Differential Dx:  Differential Diagnosis includes, but is not limited to:  Drug eruption, allergic reaction/urticatia, irritant/contact dermatitis, viral exanthem, local trauma/contusion, abrasion, erythema multiforme, Villatoro-Tim syndrome, toxic epidermal necrolysis, cellulitis, Staph scalded skin syndrome, toxic shock syndrome.       MDM:      Reviewed Nurses Note.    Reviewed Pertinent old records.    Orders Placed This Encounter    CBC Auto Differential    Comprehensive Metabolic Panel    HIV 1/2 Ag/Ab (4th Gen)    CBC with Differential    triamcinolone acetonide 0.1% (KENALOG) 0.1 % ointment    diphenhydrAMINE capsule 50 mg "                    Labs Reviewed   COMPREHENSIVE METABOLIC PANEL - Abnormal; Notable for the following components:       Result Value    Potassium Level 3.3 (*)     Glucose Level 122 (*)     Blood Urea Nitrogen 9.2 (*)     Creatinine 1.05 (*)     All other components within normal limits   CBC WITH DIFFERENTIAL - Abnormal; Notable for the following components:    WBC 11.63 (*)     RBC 4.18 (*)     MCV 95.9 (*)     MCH 32.3 (*)     Platelet 541 (*)     IG# 0.07 (*)     All other components within normal limits   CBC W/ AUTO DIFFERENTIAL    Narrative:     The following orders were created for panel order CBC Auto Differential.  Procedure                               Abnormality         Status                     ---------                               -----------         ------                     CBC with Differential[[id number]]        Abnormal            Final result                 Please view results for these tests on the individual orders.   HIV 1 / 2 ANTIBODY          No orders to display        Admission on 12/28/2023, Discharged on 12/28/2023   Component Date Value Ref Range Status    Sodium Level 12/28/2023 142  136 - 145 mmol/L Final    Potassium Level 12/28/2023 3.3 (L)  3.5 - 5.1 mmol/L Final    Chloride 12/28/2023 106  98 - 107 mmol/L Final    Carbon Dioxide 12/28/2023 26  23 - 31 mmol/L Final    Glucose Level 12/28/2023 122 (H)  82 - 115 mg/dL Final    Blood Urea Nitrogen 12/28/2023 9.2 (L)  9.8 - 20.1 mg/dL Final    Creatinine 12/28/2023 1.05 (H)  0.55 - 1.02 mg/dL Final    Calcium Level Total 12/28/2023 9.3  8.4 - 10.2 mg/dL Final    Protein Total 12/28/2023 7.3  5.8 - 7.6 gm/dL Final    Albumin Level 12/28/2023 3.8  3.4 - 4.8 g/dL Final    Globulin 12/28/2023 3.5  2.4 - 3.5 gm/dL Final    Albumin/Globulin Ratio 12/28/2023 1.1  1.1 - 2.0 ratio Final    Bilirubin Total 12/28/2023 0.6  <=1.5 mg/dL Final    Alkaline Phosphatase 12/28/2023 83  40 - 150 unit/L Final    Alanine Aminotransferase 12/28/2023  10  0 - 55 unit/L Final    Aspartate Aminotransferase 12/28/2023 18  5 - 34 unit/L Final    eGFR 12/28/2023 55  mls/min/1.73/m2 Final    WBC 12/28/2023 11.63 (H)  4.50 - 11.50 x10(3)/mcL Final    RBC 12/28/2023 4.18 (L)  4.20 - 5.40 x10(6)/mcL Final    Hgb 12/28/2023 13.5  12.0 - 16.0 g/dL Final    Hct 12/28/2023 40.1  37.0 - 47.0 % Final    MCV 12/28/2023 95.9 (H)  80.0 - 94.0 fL Final    MCH 12/28/2023 32.3 (H)  27.0 - 31.0 pg Final    MCHC 12/28/2023 33.7  33.0 - 36.0 g/dL Final    RDW 12/28/2023 13.2  11.5 - 17.0 % Final    Platelet 12/28/2023 541 (H)  130 - 400 x10(3)/mcL Final    MPV 12/28/2023 8.6  7.4 - 10.4 fL Final    Neut % 12/28/2023 69.8  % Final    Lymph % 12/28/2023 13.9  % Final    Mono % 12/28/2023 9.1  % Final    Eos % 12/28/2023 6.0  % Final    Basophil % 12/28/2023 0.6  % Final    Lymph # 12/28/2023 1.62  0.6 - 4.6 x10(3)/mcL Final    Neut # 12/28/2023 8.11  2.1 - 9.2 x10(3)/mcL Final    Mono # 12/28/2023 1.06  0.1 - 1.3 x10(3)/mcL Final    Eos # 12/28/2023 0.70  0 - 0.9 x10(3)/mcL Final    Baso # 12/28/2023 0.07  <=0.2 x10(3)/mcL Final    IG# 12/28/2023 0.07 (H)  0 - 0.04 x10(3)/mcL Final    IG% 12/28/2023 0.6  % Final    NRBC% 12/28/2023 0.0  % Final       Imaging Results    None                                        New onset diffuse psoriasis with HIV rule out      Diagnostic Impression:    1. Psoriasis    2. HIV exposure         ED Disposition Condition    Discharge              Follow-up Information       [facility] - Emergency Dept.    Specialty: Emergency Medicine  Why: If symptoms worsen  Contact information:  [id number]  [facility] [id number] [phone]                            ED Prescriptions       Medication Sig Dispense Start Date End Date Auth. Provider    triamcinolone acetonide 0.1% (KENALOG) 0.1 % ointment Apply topically 3 (three) times daily. 454 g 12/28/2023 -- Jef Salguero, DO          Follow-up Information        Follow up With Specialties Details Why Contact Info    [facility] - Emergency Dept Emergency Medicine  If symptoms worsen [id number]  [facility] [id number] [phone]             Jef Salguero, DO  12/29/23 0116